# Patient Record
(demographics unavailable — no encounter records)

---

## 2025-03-22 NOTE — CARDIOLOGY SUMMARY
[de-identified] : 3/18/25: SR 81bpm [de-identified] : no report but documented normal LV function by Dr Shin

## 2025-03-22 NOTE — HISTORY OF PRESENT ILLNESS
[FreeTextEntry1] : Mr Jones is a 36yo male with a history of asthma, exercise-induced LBBB and pAF. In May of 2020, he presented to the ED with palpitations, SOB and dizziness and was found to be in AFib. He had a LHC with no obstructive CAD and TTE with normal LVEF, no significant valvular or structural heart disease. He has been treated with BB therapy. His CHADS-VASc is 1 and he is not on anticoagulation. He has continued to have intermittent episodes over the years and in June 2023 he was started on Flecainide PRN. On an ED visit ~ 1year ago, he continued in persistent AFib and underwent a cardioversion. He wore an event monitor with no AFib. He has noted an increasing frequency of symptomatic events and had a more significant episode in December while at work a/w palpitations, dizziness/unsteady and SOB, his apple watch reported AFib 100-120's. He did take a dose of Flecainide, but it took longer to resolve, and he felt residual fatigue for several hours afterwards.  He presents today for further evaluation and management of atrial arrhythmia.

## 2025-03-22 NOTE — END OF VISIT
[FreeTextEntry3] : I, Dr. Beyer, personally performed the evaluation and management (E/M) services for this new patient.  That E/M includes conducting the initial examination, assessing all conditions, and establishing the plan of care.  Today, my ACP was here to observe my evaluation and management services for this patient to be followed going forward.

## 2025-03-22 NOTE — REVIEW OF SYSTEMS
[Feeling Fatigued] : feeling fatigued [SOB] : shortness of breath [Palpitations] : palpitations [Dizziness] : dizziness [Chest Discomfort] : no chest discomfort [Lower Ext Edema] : no extremity edema [Orthopnea] : no orthopnea [PND] : no PND [Syncope] : no syncope [Easy Bleeding] : no tendency for easy bleeding

## 2025-03-22 NOTE — DISCUSSION/SUMMARY
[FreeTextEntry1] : Mr Jones is a 36yo male with a history of asthma, exercise-induced LBBB and pAF. He was diagnosed with AFib around 5/2020 when he presented to the ED with palpitations, SOB and dizziness and was found to be in AFib. He had a LHC with no obstructive CAD and TTE with normal LVEF, no significant valvular or structural heart disease. He has been treated with BB therapy. His CHADS-VASc is 1 and he is not on anticoagulation. He has continued to have intermittent episodes over the years and in June 2023 he was started on Flecainide PRN. On an ED visit ~ 1year ago, he continued in persistent AFib and underwent a cardioversion. He wore an event monitor with no AFib. He has noted an increasing frequency of symptomatic events and had a more significant episode in December while at work a/w palpitations, dizziness/unsteady and SOB, his apple watch reported AFib 100-120's. He did take a dose of Flecainide, but it took longer to resolve, and he felt residual fatigue for several hours afterwards.  He presents today for further evaluation and management of atrial arrhythmia.   He denies any significant family history of AFib or genetic/infiltrative disease. Given his young age, we should obtain a cMR to evaluate for any scarring, structural disease, inflammatory/infiltrative disease or sarcoidosis. We discussed atrial fibrillation and its natural history as well as the associated stroke risk. We discussed rhythm control strategies and focused our discussion on ablation. We discussed the procedure, post-procedure care, risk/benefit profile and follow-up. He conveyed understanding and would like to proceed with an ablation to treat his arrhythmia. We discussed that although his CHADS-VASc is 1, we recommend starting anticoagulation and he will need to continue this for at least 3-months post-ablation. He understands and agrees with this recommendation. We did review factors that can potentially trigger/exacerbate AFib and advised he avoid excessive alcohol or caffeine and stay well hydrated. After all his questions were answered we concluded our visit.   -cMR to R/O LGE/sarcoidosis -Schedule ablation at [facility] -Start Eliquis 5mg BID and continue without interruption through the ablation procedure -Hold Toprol and Flecainide for 48hours prior to ablation -Hold metformin the morning of the procedure due to NPO status

## 2025-03-22 NOTE — DISCUSSION/SUMMARY
[FreeTextEntry1] : Mr Jones is a 36yo male with a history of asthma, exercise-induced LBBB and pAF. He was diagnosed with AFib around 5/2020 when he presented to the ED with palpitations, SOB and dizziness and was found to be in AFib. He had a LHC with no obstructive CAD and TTE with normal LVEF, no significant valvular or structural heart disease. He has been treated with BB therapy. His CHADS-VASc is 1 and he is not on anticoagulation. He has continued to have intermittent episodes over the years and in June 2023 he was started on Flecainide PRN. On an ED visit ~ 1year ago, he continued in persistent AFib and underwent a cardioversion. He wore an event monitor with no AFib. He has noted an increasing frequency of symptomatic events and had a more significant episode in December while at work a/w palpitations, dizziness/unsteady and SOB, his apple watch reported AFib 100-120's. He did take a dose of Flecainide, but it took longer to resolve, and he felt residual fatigue for several hours afterwards.  He presents today for further evaluation and management of atrial arrhythmia.   He denies any significant family history of AFib or genetic/infiltrative disease. Given his young age, we should obtain a cMR to evaluate for any scarring, structural disease, inflammatory/infiltrative disease or sarcoidosis. We discussed atrial fibrillation and its natural history as well as the associated stroke risk. We discussed rhythm control strategies and focused our discussion on ablation. We discussed the procedure, post-procedure care, risk/benefit profile and follow-up. He conveyed understanding and would like to proceed with an ablation to treat his arrhythmia. We discussed that although his CHADS-VASc is 1, we recommend starting anticoagulation and he will need to continue this for at least 3-months post-ablation. He understands and agrees with this recommendation. We did review factors that can potentially trigger/exacerbate AFib and advised he avoid excessive alcohol or caffeine and stay well hydrated. After all his questions were answered we concluded our visit.   -cMR to R/O LGE/sarcoidosis -Schedule ablation at [facility] -Start Eliquis 5mg BID and continue without interruption through the ablation procedure -Hold Toprol and Flecainide for 48hours prior to ablation -Hold metformin the morning of the procedure due to NPO status

## 2025-03-22 NOTE — CARDIOLOGY SUMMARY
[de-identified] : 3/18/25: SR 81bpm [de-identified] : no report but documented normal LV function by Dr Shin